# Patient Record
Sex: MALE | URBAN - METROPOLITAN AREA
[De-identification: names, ages, dates, MRNs, and addresses within clinical notes are randomized per-mention and may not be internally consistent; named-entity substitution may affect disease eponyms.]

---

## 2023-11-27 ENCOUNTER — OFFICE VISIT (OUTPATIENT)
Dept: PAIN MEDICINE | Facility: CLINIC | Age: 63
End: 2023-11-27
Payer: MEDICAID

## 2023-11-27 DIAGNOSIS — M47.816 LUMBAR SPONDYLOSIS: Primary | ICD-10-CM

## 2023-11-27 PROCEDURE — 99214 OFFICE O/P EST MOD 30 MIN: CPT | Performed by: PAIN MEDICINE

## 2023-11-27 PROCEDURE — 1036F TOBACCO NON-USER: CPT | Performed by: PAIN MEDICINE

## 2023-11-27 RX ORDER — ALLOPURINOL 300 MG/1
TABLET ORAL
COMMUNITY

## 2023-11-27 RX ORDER — CHOLECALCIFEROL (VITAMIN D3) 1250 MCG
TABLET ORAL
COMMUNITY

## 2023-11-27 RX ORDER — TRAMADOL HYDROCHLORIDE 50 MG/1
TABLET ORAL
COMMUNITY
Start: 2020-10-12

## 2023-11-27 RX ORDER — OMEPRAZOLE 40 MG/1
CAPSULE, DELAYED RELEASE ORAL
COMMUNITY
Start: 2020-10-12

## 2023-11-27 RX ORDER — ATORVASTATIN CALCIUM 40 MG/1
TABLET, FILM COATED ORAL
COMMUNITY
Start: 2023-01-03

## 2023-11-27 RX ORDER — NAPROXEN 500 MG/1
TABLET ORAL
COMMUNITY
Start: 2023-11-01

## 2023-11-27 RX ORDER — CARVEDILOL 25 MG/1
TABLET ORAL
COMMUNITY
Start: 2016-03-11

## 2023-11-27 ASSESSMENT — PAIN SCALES - GENERAL: PAINLEVEL_OUTOF10: 7

## 2023-11-27 ASSESSMENT — PAIN - FUNCTIONAL ASSESSMENT: PAIN_FUNCTIONAL_ASSESSMENT: 0-10
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
## 2023-11-27 NOTE — PROGRESS NOTES
"Has had back pain at least 3-5 yuears  In the past has had \"shots\" that have helped  Does recall having a MRI  States he has had PT for this  "

## 2024-01-25 ENCOUNTER — APPOINTMENT (OUTPATIENT)
Dept: PAIN MEDICINE | Facility: CLINIC | Age: 64
End: 2024-01-25
Payer: MEDICAID

## 2024-08-27 ENCOUNTER — TELEPHONE (OUTPATIENT)
Dept: NEUROSURGERY | Facility: HOSPITAL | Age: 64
End: 2024-08-27
Payer: MEDICAID

## 2024-09-03 NOTE — PROGRESS NOTES
It was a pleasure to see Mr. Crouch at the Neurosurgery Spine Clinic at [facility].     He is a really nice 64 y.o. male  who presents to us with complaints primarily axial LOWER BACK pain  that started about  5  years  ago, and have been unchanged since that time.  The symptoms started after no known injury    The pain is 8 /10. The pain is described as aching and occurs all day.  Symptoms are exacerbated by sitting. Factors which relieve the pain include  walking       Numbness and/or tingling - NO    Weakness : NO    Bladder/Bowel symptoms - NO    The patient has tried medications (eg: gabapentin, NSAIDS and narcotics ) : Yes  PT : Yes    Date: last 1 year ago  Pain Management with ESIs/selective nerve blocks  - YES    he is a NON-SMOKER and NON-DIABETIC    History of Depression : NO    PRIOR SPINE SURGERY: NO    Denies use of Aspirin, Coumadin, or Plavix or any other anticoagulants     NARCOTICS for pain : YES    Part of this patient’s history is from personal review of the patient’s previous charts.      Past Medical History:   Diagnosis Date    Personal history of other diseases of the circulatory system     History of hypertension    Personal history of other diseases of the nervous system and sense organs     History of sleep apnea           Current Outpatient Medications:     allopurinol (Zyloprim) 300 mg tablet, , Disp: , Rfl:     carvedilol (Coreg) 25 mg tablet, Take by mouth., Disp: , Rfl:     cholecalciferol (Vitamin D3) 1,250 mcg (50,000 unit) tablet, Take by mouth., Disp: , Rfl:     Lipitor 40 mg tablet, , Disp: , Rfl:     naproxen (Naprosyn) 500 mg tablet, , Disp: , Rfl:     omeprazole (PriLOSEC) 40 mg DR capsule, TAKE 1 CAPSULE BY MOUTH EVERY DAY 30 MINUTES BEFORE BREAKFAST, Disp: , Rfl:     traMADol (Ultram) 50 mg tablet, Take by mouth., Disp: , Rfl:       Review of Systems :   Constitutional: No fever or chills  Respiratory: No shortness of breath or wheezing  Musculoskeletal: see HPI  above   Neurologic: See HPI above        EXAM:   NEURO: Neurologically patient is awake alert and oriented X 3    No obvious cranial nerve deficit.  Strength is almost 5/5 throughout all motor groups tested with no asymmetric significant motor deficit.  Deep tendon reflexes are symmetric throughout.   Gait : WNL   Sensory examination is intact to touch and painful stimuli.      IMAGING:   MRI of the CERVICAL spine that was done at [facility] and available to review on PACS was personally evaluated and shows presence of age-related degenerative changes but no evidence of any significant nerve root or spinal cord compression.    MRI of the LUMBAR spine that was done at [facility] and available to review on the PACS was personally evaluated and shows presence of age-related degenerative changes but no evidence of any significant nerve root compression.      CT scan of the lumbar spine that was done on [facility] 2 than 24 recently was also reviewed and does not shows any evidence of fracture or subluxation.    ASSESSMENT AND PLAN:  Mr. Crouch is a really nice 64 y.o. male with symptoms of predominantly axial back pain.      He  does not have any neurologic symptoms suggestive of nerve root compression ( radiculopathy, neurogenic claudication ) and have no focal neurologic deficits on examination or any bowel bladder symptoms attributable to any spinal pathology.    I reviewed his MRI and other spinal imaging available and discussed the findings with him.  The imaging does not shows any pathology that in my opinion requires any surgical intervention  for improvement in the patients ongoing symptoms.      I recommended activity modification, regular aerobic exercise, and core and back muscle strengthening.   Discussed with the patient extremely common prevalence of back pain and the overall benign nature of the same in the absence of any obvious surgical pathology as is the case with the patient in my opinion.  Importance of appropriate  posture and ergonomics, appropriate lifting techniques, adequate sleep, stress management and relaxation techniques, maintenance of healthy weight and adequate hydration was discussed in terms of strategies to possibly alleviate back pain.    Continued PT and pain management evaluation and treatment options discussed and would be preferable to any surgical treatment in my opinion if the patients feels his symptoms are severe enough to warrant ongoing treatment that require more than over the counter medications for symptomatic relief.  Also discussed that the patient should feel free to seek another surgical opinion if he would wish to.      It was a pleasure to participate in Ahmad care.           Andre Rodriguez MD, [facility]   of Neurological Surgery  [facility]  Attending Surgeon  Director - Minimally Invasive Spine Surgery  [place], [place]      Some of this note was completed using Dragon voice recognition technology and sometimes the software misinterprets words. This may include unintended errors with respect to translation of words, typographical errors or grammar errors which may not have been identified prior to finalization of the chart note. Please take this into account when reading this note

## 2024-09-05 ENCOUNTER — OFFICE VISIT (OUTPATIENT)
Dept: NEUROSURGERY | Facility: CLINIC | Age: 64
End: 2024-09-05
Payer: MEDICAID

## 2024-09-05 VITALS
DIASTOLIC BLOOD PRESSURE: 77 MMHG | BODY MASS INDEX: 30.05 KG/M2 | SYSTOLIC BLOOD PRESSURE: 138 MMHG | RESPIRATION RATE: 20 BRPM | HEART RATE: 68 BPM | HEIGHT: 66 IN | WEIGHT: 187 LBS

## 2024-09-05 DIAGNOSIS — G89.29 OTHER CHRONIC BACK PAIN: Primary | ICD-10-CM

## 2024-09-05 DIAGNOSIS — M54.9 OTHER CHRONIC BACK PAIN: Primary | ICD-10-CM

## 2024-09-05 PROCEDURE — 3008F BODY MASS INDEX DOCD: CPT | Performed by: NEUROLOGICAL SURGERY

## 2024-09-05 PROCEDURE — 1036F TOBACCO NON-USER: CPT | Performed by: NEUROLOGICAL SURGERY

## 2024-09-05 PROCEDURE — 99244 OFF/OP CNSLTJ NEW/EST MOD 40: CPT | Performed by: NEUROLOGICAL SURGERY

## 2024-09-05 ASSESSMENT — PAIN SCALES - GENERAL: PAINLEVEL: 8

## 2025-01-13 ENCOUNTER — OFFICE VISIT (OUTPATIENT)
Dept: PAIN MEDICINE | Facility: CLINIC | Age: 65
End: 2025-01-13
Payer: MEDICAID

## 2025-01-13 DIAGNOSIS — M47.816 LUMBAR SPONDYLOSIS: Primary | ICD-10-CM

## 2025-01-13 PROCEDURE — 99214 OFFICE O/P EST MOD 30 MIN: CPT | Performed by: PAIN MEDICINE

## 2025-01-13 ASSESSMENT — PAIN - FUNCTIONAL ASSESSMENT: PAIN_FUNCTIONAL_ASSESSMENT: 0-10

## 2025-01-13 ASSESSMENT — COLUMBIA-SUICIDE SEVERITY RATING SCALE - C-SSRS
6. HAVE YOU EVER DONE ANYTHING, STARTED TO DO ANYTHING, OR PREPARED TO DO ANYTHING TO END YOUR LIFE?: NO
2. HAVE YOU ACTUALLY HAD ANY THOUGHTS OF KILLING YOURSELF?: NO
1. IN THE PAST MONTH, HAVE YOU WISHED YOU WERE DEAD OR WISHED YOU COULD GO TO SLEEP AND NOT WAKE UP?: NO

## 2025-01-13 NOTE — H&P
History Of Present Illness  Catie Crouch is a 64 y.o. male presenting with chronic back pain   Under the care of Dr. Ortega who was providing him with injection in the lumbar spine area confirming positive response to the intervention last injection was 4 months ago he is describing recurrence of his symptoms with back pain.  Tramadol 50 mg 4 pills/day are being issued by Dr. Gay his rheumatology.  And was under the care of Dr. Newell who is providing him with Percocet 5/325 his last prescription was issued on November 21, 2024.  Continues to be on the naproxen 500 mg twice per day.  Was doing physical therapy and [place] all around with some improvement.  Rating currently the pain at the level of 8 out of 10 was seen and evaluated by spine surgeon who recommended for him to continue with pain management and physical therapy.  Pain is worse with during sitting.  Pain is improved with standing up and walking  Oswestry low back disability questionnaire was filled today and graded at    29   Past Medical History  Past Medical History:   Diagnosis Date    Personal history of other diseases of the circulatory system     History of hypertension    Personal history of other diseases of the nervous system and sense organs     History of sleep apnea     Surgical History  Past Surgical History:   Procedure Laterality Date    EYE SURGERY  11/15/2016    Eye Surgery    OTHER SURGICAL HISTORY  02/28/2022    Nasal septoplasty     Social History  He reports that he has never smoked. He has never used smokeless tobacco. He reports that he does not drink alcohol and does not use drugs.    Family History  Family History   Problem Relation Name Age of Onset    No Known Problems Mother      No Known Problems Father          Allergies  No Known Allergies  Review of Systems   12 Systems have been reviewed as follows.   Constitutional: Fever, weight gain, weight loss, appetite change, night sweats, fatigue, chills.  Eyes : blurry, double  vision, vision, loss, tearing, redness, pain, sensitivity to light, glaucoma.  Ears, nose, mouth, and throat: Hearing loss, ringing in the ears, ear pain, nasal congestion, nasal drainage, nosebleeds, mouth, throat, irritation tooth problem.  Cardiovascular :chest pain, pressure, heart tracing,palpaitations , sweating, leg swelling, high or low blood pressure  Pulmonary: Cough, yellow or green sputum, blood and sputum, shortness of breath, wheezing  Gastrointestinal: Nause, vomiting, diarrhea, constipation, pain, blood in stool, or vomitus, heartburn, difficulty swallowing  Genitourinary: incontinence, abnormal bleeding, abnormal discharge, urinary frequency, urinary hesitancy, pain, impotence sexual problem, infection, urinary retention  Musculoskeletal: Pain, stiffness, joint, redness or warmth, arthritis, back pain, weakness, muscle wasting, sprain or fracture  Neuro: Weight weakness, dizziness, change in voice, change in taste change in vision, change in hearing, loss, or change of sensation, trouble walking, balance problems coordination problems, shaking, speech problem  Endocrine , cold or heat intolerance, blood sugar problem, weight gain or loss missed periods hot flashes, sweats, change in body hair, change in libido, increased thirst, increased urination  Heme/lymph: Swelling, bleeding, problem anemia, bruising, enlarged lymph nodes  Allergic/immunologic: H. plus nasal drip, watery itchy eyes, nasal drainage, immunosuppressed  The above, were reviewed and noted negative except as noted.    Physical Exam       Past medical history no interval changes has been noted    On physical examination    General   Alert, oriented x3 pleasant and cooperative. Does not look in any major distress.    HEENT  Pupils normal in size. Ears, nose, mouth, and throat appear to be in normal condition.  Head atraumatic      No signs of sedation or signs of withdrawal apparent.    Psychiatric   No signs of depression  apparent.    Neuro   No focal neurological deficit apparent. Ambulation at baseline.  Positive tenderness upon the palpation of the lumbar facet described by the patient worse on the right side 64 years old with history and physical examination supportive of lumbago lumbar spondylosis    Respiratory  No respiratory distress     Abdomen  no distention     Skin  No skin markings supportive of recent IV drug usage .    Cardiovascular  Regular rate and rhythm    Last Recorded Vitals  There were no vitals taken for this visit.    Assessment/Plan   Tried and failed conservative management with physical therapy and nonsteroidal anti-inflammatory    Plan  Advised the patient to obtain an x-ray of the lumbar spine area he is confirming that he had recent images performed so I advised him to provide me with the record if they are not available then he will proceed with a new x-ray  I recommended for the patient diagnostic medial nerve branch blocks to be performed under fluoroscopic guidance targeting the L3-L4 L4-L5 medial nerve branches bilaterally if the patient described positive response at that time could proceed with the denervation with a radiofrequency ablation of the medial nerve branches targeting the L3-L4 L4-L5 bilaterally benefits and risk were discussed with the patient and he is in agreement to proceed      The above clinical summary has been dictated with voice recognition software. It has not been proofread for grammatical errors, typographical mistakes, or other semantic inconsistencies.    Thank you for visiting our office today. It was our pleasure to take part in your healthcare.     Please do not hesitate to contact the pain clinic after your visit with any questions or concerns at  M-F 8-4 pm       Jessi Colby M.D.  Medical Director , Division of Pain Medicine [facility]   of Anesthesiology and Pain  Medicine  [facility]     [place] [id number] [facility]  [address]  [place] [address]     Office: [phone]  Fax: [phone]      Jessi Colby MD